# Patient Record
Sex: MALE | Race: WHITE | NOT HISPANIC OR LATINO | URBAN - METROPOLITAN AREA
[De-identification: names, ages, dates, MRNs, and addresses within clinical notes are randomized per-mention and may not be internally consistent; named-entity substitution may affect disease eponyms.]

---

## 2024-01-03 ENCOUNTER — EMERGENCY (EMERGENCY)
Facility: HOSPITAL | Age: 64
LOS: 1 days | Discharge: HOME | End: 2024-01-03
Attending: EMERGENCY MEDICINE | Admitting: EMERGENCY MEDICINE
Payer: COMMERCIAL

## 2024-01-03 VITALS
OXYGEN SATURATION: 96 % | HEART RATE: 62 BPM | RESPIRATION RATE: 18 BRPM | DIASTOLIC BLOOD PRESSURE: 82 MMHG | TEMPERATURE: 98 F | SYSTOLIC BLOOD PRESSURE: 131 MMHG

## 2024-01-03 VITALS
HEART RATE: 93 BPM | TEMPERATURE: 98 F | DIASTOLIC BLOOD PRESSURE: 93 MMHG | OXYGEN SATURATION: 96 % | RESPIRATION RATE: 16 BRPM | WEIGHT: 177.69 LBS | SYSTOLIC BLOOD PRESSURE: 167 MMHG | HEIGHT: 69 IN

## 2024-01-03 LAB
ANION GAP SERPL CALC-SCNC: 14 MMOL/L — SIGNIFICANT CHANGE UP (ref 5–17)
ANION GAP SERPL CALC-SCNC: 14 MMOL/L — SIGNIFICANT CHANGE UP (ref 5–17)
APTT BLD: 27.6 SEC — SIGNIFICANT CHANGE UP (ref 24.5–35.6)
APTT BLD: 27.6 SEC — SIGNIFICANT CHANGE UP (ref 24.5–35.6)
BASOPHILS # BLD AUTO: 0.06 K/UL — SIGNIFICANT CHANGE UP (ref 0–0.2)
BASOPHILS # BLD AUTO: 0.06 K/UL — SIGNIFICANT CHANGE UP (ref 0–0.2)
BASOPHILS NFR BLD AUTO: 1.1 % — SIGNIFICANT CHANGE UP (ref 0–2)
BASOPHILS NFR BLD AUTO: 1.1 % — SIGNIFICANT CHANGE UP (ref 0–2)
BUN SERPL-MCNC: 21 MG/DL — SIGNIFICANT CHANGE UP (ref 7–23)
BUN SERPL-MCNC: 21 MG/DL — SIGNIFICANT CHANGE UP (ref 7–23)
CALCIUM SERPL-MCNC: 10.1 MG/DL — SIGNIFICANT CHANGE UP (ref 8.4–10.5)
CALCIUM SERPL-MCNC: 10.1 MG/DL — SIGNIFICANT CHANGE UP (ref 8.4–10.5)
CHLORIDE SERPL-SCNC: 99 MMOL/L — SIGNIFICANT CHANGE UP (ref 96–108)
CHLORIDE SERPL-SCNC: 99 MMOL/L — SIGNIFICANT CHANGE UP (ref 96–108)
CO2 SERPL-SCNC: 21 MMOL/L — LOW (ref 22–31)
CO2 SERPL-SCNC: 21 MMOL/L — LOW (ref 22–31)
CREAT SERPL-MCNC: 1.23 MG/DL — SIGNIFICANT CHANGE UP (ref 0.5–1.3)
CREAT SERPL-MCNC: 1.23 MG/DL — SIGNIFICANT CHANGE UP (ref 0.5–1.3)
EGFR: 66 ML/MIN/1.73M2 — SIGNIFICANT CHANGE UP
EGFR: 66 ML/MIN/1.73M2 — SIGNIFICANT CHANGE UP
EOSINOPHIL # BLD AUTO: 0.06 K/UL — SIGNIFICANT CHANGE UP (ref 0–0.5)
EOSINOPHIL # BLD AUTO: 0.06 K/UL — SIGNIFICANT CHANGE UP (ref 0–0.5)
EOSINOPHIL NFR BLD AUTO: 1.1 % — SIGNIFICANT CHANGE UP (ref 0–6)
EOSINOPHIL NFR BLD AUTO: 1.1 % — SIGNIFICANT CHANGE UP (ref 0–6)
GLUCOSE SERPL-MCNC: 108 MG/DL — HIGH (ref 70–99)
GLUCOSE SERPL-MCNC: 108 MG/DL — HIGH (ref 70–99)
HCT VFR BLD CALC: 45.1 % — SIGNIFICANT CHANGE UP (ref 39–50)
HCT VFR BLD CALC: 45.1 % — SIGNIFICANT CHANGE UP (ref 39–50)
HGB BLD-MCNC: 15.8 G/DL — SIGNIFICANT CHANGE UP (ref 13–17)
HGB BLD-MCNC: 15.8 G/DL — SIGNIFICANT CHANGE UP (ref 13–17)
IMM GRANULOCYTES NFR BLD AUTO: 0.5 % — SIGNIFICANT CHANGE UP (ref 0–0.9)
IMM GRANULOCYTES NFR BLD AUTO: 0.5 % — SIGNIFICANT CHANGE UP (ref 0–0.9)
INR BLD: 1 — SIGNIFICANT CHANGE UP (ref 0.85–1.18)
INR BLD: 1 — SIGNIFICANT CHANGE UP (ref 0.85–1.18)
LYMPHOCYTES # BLD AUTO: 1.36 K/UL — SIGNIFICANT CHANGE UP (ref 1–3.3)
LYMPHOCYTES # BLD AUTO: 1.36 K/UL — SIGNIFICANT CHANGE UP (ref 1–3.3)
LYMPHOCYTES # BLD AUTO: 24.1 % — SIGNIFICANT CHANGE UP (ref 13–44)
LYMPHOCYTES # BLD AUTO: 24.1 % — SIGNIFICANT CHANGE UP (ref 13–44)
MCHC RBC-ENTMCNC: 34.3 PG — HIGH (ref 27–34)
MCHC RBC-ENTMCNC: 34.3 PG — HIGH (ref 27–34)
MCHC RBC-ENTMCNC: 35 GM/DL — SIGNIFICANT CHANGE UP (ref 32–36)
MCHC RBC-ENTMCNC: 35 GM/DL — SIGNIFICANT CHANGE UP (ref 32–36)
MCV RBC AUTO: 97.8 FL — SIGNIFICANT CHANGE UP (ref 80–100)
MCV RBC AUTO: 97.8 FL — SIGNIFICANT CHANGE UP (ref 80–100)
MONOCYTES # BLD AUTO: 0.7 K/UL — SIGNIFICANT CHANGE UP (ref 0–0.9)
MONOCYTES # BLD AUTO: 0.7 K/UL — SIGNIFICANT CHANGE UP (ref 0–0.9)
MONOCYTES NFR BLD AUTO: 12.4 % — SIGNIFICANT CHANGE UP (ref 2–14)
MONOCYTES NFR BLD AUTO: 12.4 % — SIGNIFICANT CHANGE UP (ref 2–14)
NEUTROPHILS # BLD AUTO: 3.43 K/UL — SIGNIFICANT CHANGE UP (ref 1.8–7.4)
NEUTROPHILS # BLD AUTO: 3.43 K/UL — SIGNIFICANT CHANGE UP (ref 1.8–7.4)
NEUTROPHILS NFR BLD AUTO: 60.8 % — SIGNIFICANT CHANGE UP (ref 43–77)
NEUTROPHILS NFR BLD AUTO: 60.8 % — SIGNIFICANT CHANGE UP (ref 43–77)
NRBC # BLD: 0 /100 WBCS — SIGNIFICANT CHANGE UP (ref 0–0)
NRBC # BLD: 0 /100 WBCS — SIGNIFICANT CHANGE UP (ref 0–0)
PLATELET # BLD AUTO: 214 K/UL — SIGNIFICANT CHANGE UP (ref 150–400)
PLATELET # BLD AUTO: 214 K/UL — SIGNIFICANT CHANGE UP (ref 150–400)
POTASSIUM SERPL-MCNC: 3.9 MMOL/L — SIGNIFICANT CHANGE UP (ref 3.5–5.3)
POTASSIUM SERPL-MCNC: 3.9 MMOL/L — SIGNIFICANT CHANGE UP (ref 3.5–5.3)
POTASSIUM SERPL-SCNC: 3.9 MMOL/L — SIGNIFICANT CHANGE UP (ref 3.5–5.3)
POTASSIUM SERPL-SCNC: 3.9 MMOL/L — SIGNIFICANT CHANGE UP (ref 3.5–5.3)
PROTHROM AB SERPL-ACNC: 11.4 SEC — SIGNIFICANT CHANGE UP (ref 9.5–13)
PROTHROM AB SERPL-ACNC: 11.4 SEC — SIGNIFICANT CHANGE UP (ref 9.5–13)
RBC # BLD: 4.61 M/UL — SIGNIFICANT CHANGE UP (ref 4.2–5.8)
RBC # BLD: 4.61 M/UL — SIGNIFICANT CHANGE UP (ref 4.2–5.8)
RBC # FLD: 12.4 % — SIGNIFICANT CHANGE UP (ref 10.3–14.5)
RBC # FLD: 12.4 % — SIGNIFICANT CHANGE UP (ref 10.3–14.5)
SODIUM SERPL-SCNC: 134 MMOL/L — LOW (ref 135–145)
SODIUM SERPL-SCNC: 134 MMOL/L — LOW (ref 135–145)
WBC # BLD: 5.64 K/UL — SIGNIFICANT CHANGE UP (ref 3.8–10.5)
WBC # BLD: 5.64 K/UL — SIGNIFICANT CHANGE UP (ref 3.8–10.5)
WBC # FLD AUTO: 5.64 K/UL — SIGNIFICANT CHANGE UP (ref 3.8–10.5)
WBC # FLD AUTO: 5.64 K/UL — SIGNIFICANT CHANGE UP (ref 3.8–10.5)

## 2024-01-03 PROCEDURE — 80048 BASIC METABOLIC PNL TOTAL CA: CPT

## 2024-01-03 PROCEDURE — 93005 ELECTROCARDIOGRAM TRACING: CPT

## 2024-01-03 PROCEDURE — 99283 EMERGENCY DEPT VISIT LOW MDM: CPT | Mod: 25

## 2024-01-03 PROCEDURE — 36415 COLL VENOUS BLD VENIPUNCTURE: CPT

## 2024-01-03 PROCEDURE — 85610 PROTHROMBIN TIME: CPT

## 2024-01-03 PROCEDURE — 85025 COMPLETE CBC W/AUTO DIFF WBC: CPT

## 2024-01-03 PROCEDURE — 85730 THROMBOPLASTIN TIME PARTIAL: CPT

## 2024-01-03 PROCEDURE — 99284 EMERGENCY DEPT VISIT MOD MDM: CPT

## 2024-01-03 RX ORDER — RIVAROXABAN 15 MG-20MG
1 KIT ORAL
Qty: 30 | Refills: 2
Start: 2024-01-03

## 2024-01-03 RX ORDER — RIVAROXABAN 15 MG-20MG
1 KIT ORAL
Qty: 42 | Refills: 0
Start: 2024-01-03 | End: 2024-01-23

## 2024-01-03 NOTE — ED ADULT NURSE NOTE - NSFALLUNIVINTERV_ED_ALL_ED
Bed/Stretcher in lowest position, wheels locked, appropriate side rails in place/Call bell, personal items and telephone in reach/Instruct patient to call for assistance before getting out of bed/chair/stretcher/Non-slip footwear applied when patient is off stretcher/[place] to call system/Physically safe environment - no spills, clutter or unnecessary equipment/Purposeful proactive rounding/Room/bathroom lighting operational, light cord in reach Bed/Stretcher in lowest position, wheels locked, appropriate side rails in place/Call bell, personal items and telephone in reach/Instruct patient to call for assistance before getting out of bed/chair/stretcher/Non-slip footwear applied when patient is off stretcher/[place] to call system/Physically safe environment - no spills, clutter or unnecessary equipment/Purposeful proactive rounding/Room/bathroom lighting operational, light cord in reach

## 2024-01-03 NOTE — ED PROVIDER NOTE - IV ALTEPLASE EXCL REL HIDDEN
show Terbinafine Pregnancy And Lactation Text: This medication is Pregnancy Category B and is considered safe during pregnancy. It is also excreted in breast milk and breast feeding isn't recommended.

## 2024-01-03 NOTE — ED PROVIDER NOTE - INTERPRETATION
incomplete RBBB, left posterior fascicular block, anterior infarct age undetermined/normal sinus rhythm

## 2024-01-03 NOTE — ED PROVIDER NOTE - PATIENT PORTAL LINK FT
You can access the FollowMyHealth Patient Portal offered by [facility] by registering at the following website: [email or web address]. By joining [employer]’s FollowMyHealth portal, you will also be able to view your health information using other applications (apps) compatible with our system. You can access the FollowMyHealth Patient Portal offered by [facility] by registering at the following website: [email or web address]. By joining [employer]’s FollowMyHealth portal, you will also be able to view your health information using other applications (apps) compatible with our system.

## 2024-01-03 NOTE — ED ADULT NURSE NOTE - OBJECTIVE STATEMENT
63y pmhx DVT in Oct 2022 presents to ER from a radiology appt due to a DVT found in left upper leg. Pt states that he flew from [place] last Thursday, has a bit of calf pain and went to urgent care, which prompted a radiology appt for leg. Pt currently denies any pain, states left lower calf feels warmer than right. Denies SOB/CP, N/V/D, F/C. 63y pmhx DVT in Oct 2022 presents to ER from a radiology appt due to a DVT found in left upper leg. Pt states that he flew from [place] last Thursday, has a bit of calf pain and went to urgent care, which prompted a radiology appt for leg. Pt currently denies any pain, states left lower calf feels warmer than right. Denies SOB/CP, N/V/D, F/C.

## 2024-01-03 NOTE — ED PROVIDER NOTE - NSFOLLOWUPCLINICS_GEN_ALL_ED_FT
[facility]  Family Medicine  [address]  [place], [place] [address]  Phone: [phone]  Fax:   Follow Up Time: 4-6 Days     [facility]  Family Medicine  [address]  [place], [place] [address]  Phone: [phone]  Fax:   Follow Up Time: 4-6 Days

## 2024-01-03 NOTE — ED PROVIDER NOTE - OBJECTIVE STATEMENT
63-year-old male with history of DVT to left lower extremity approximately 1 year ago for which he was on rivaroxaban for 4-5 months which was subsequently discontinued by his physicians presents today with left lower extremity swelling and pain in the context of taking a flight from [place] to [place] approximately 1 week ago.  Patient noted the day after he took the flight he had left lower extremity swelling of the left calf with pain.  Went to an urgent care and was told he had cellulitis and was started on antibiotics.  Patient subsequently had a DVT study done outpatient today and was told that he had a DVT, possibly chronic and was sent to ED for further evaluation.  No chest pain, shortness of breath, fevers, headache, chills or any other complaints. 63-year-old male with history of DVT to left lower extremity approximately 1 year ago for which he was on rivaroxaban for 4-5 months which was subsequently discontinued by his physicians presents today with left lower extremity swelling and pain in the context of taking a flight from [place] to [place] approximately 1 week ago.  Patient noted the day after he took the flight he had left lower extremity swelling of the left calf with pain.  Went to an urgent care and was told he had cellulitis and was started on antibiotics.  Patient subsequently had a DVT study done outpatient today and was told that he had a DVT, possibly chronic and was sent to ED for further evaluation.  No chest pain, shortness of breath, fevers, headache, chills or any other complaints. 63-year-old male with history of DVT to left lower extremity approximately 1 year ago for which he was on rivaroxaban for 4-5 months, which was subsequently discontinued by his physicians, presents today with left lower extremity swelling and pain in the context of taking a flight from [place] to [place] approximately 1 week ago. Patient noted the day after he took the flight he had left lower extremity/ left calf pain with pain.  Went to an urgent care and was told he had cellulitis and was started on antibiotics.  Patient subsequently had an outpatient DVT study done today and was told that he had a DVT, possibly chronic, and was sent to [facility] ED for further evaluation.  No chest pain, shortness of breath, fevers, headache, chills or any other complaints. 63-year-old male with history of DVT to left lower extremity approximately 1 year ago for which he was on rivaroxaban for 4-5 months, which was subsequently discontinued by his physicians, presents today with left lower extremity swelling and pain in the context of taking a flight from [place] to [place] approximately 1 week ago. Patient noted the day after he took the flight he had left lower extremity/ left calf pain with pain.  Went to an urgent care and was told he had cellulitis and was started on antibiotics.  Patient subsequently had an outpatient DVT study done today and was told that he had a DVT, possibly chronic, and was sent to [facility] ED for further evaluation.  No chest pain, shortness of breath, fevers, headache, chills or any other complaints.

## 2024-01-03 NOTE — ED ADULT NURSE NOTE - CHIEF COMPLAINT QUOTE
Pt presents to ED C/O L leg pain with dx known blood clot. Pt has imaging at bedside. States, " They said I have a clot going all the way up my groin that has been there for some time" Hx DVT in 2022. Reports frequent flights from [place] to [place]. Pt presents to ED C/O L leg pain with dx known blood clot. Pt has imaging at bedside. States, " They said I have a clot going all the way up my groin that has been there for some time" Hx DVT in 2022. Reports frequent flights from [place] to [place].

## 2024-01-03 NOTE — ED PROVIDER NOTE - NSFOLLOWUPINSTRUCTIONS_ED_ALL_ED_FT
Size Of Lesion In Cm (Optional): 0
Body Location Override (Optional - Billing Will Still Be Based On Selected Body Map Location If Applicable): R chest
Detail Level: Simple
Body Location Override (Optional - Billing Will Still Be Based On Selected Body Map Location If Applicable): R flakita
Please follow-up with your primary care doctor in 1 week.  Return to the ER if you develop any concerning symptoms.    Deep Vein Thrombosis    A deep vein thrombosis (DVT) is a blood clot (thrombus) that usually occurs in a deep, larger vein of the lower leg or the pelvis, or in an upper extremity such as the arm. These are dangerous and can lead to serious and even life-threatening complications if the clot travels to the lungs. Symptoms include swelling of the arm or leg, warmth and redness of the arm or leg, and pain. Treatment may include taking a blood thinning medication; make sure to take anything prescribed as instructed.    SEEK IMMEDIATE MEDICAL CARE IF YOU HAVE ANY OF THE FOLLOWING SYMPTOMS: shortness of breath, chest pain, rapid or irregular heartbeat, lightheadedness/dizziness, coughing up blood, or any bleeding in your vomit, stool, or urine. These symptoms may represent a serious problem that is an emergency. Do not wait to see if the symptoms will go away. Call 911 and do not drive yourself to the hospital.    Hypertension    Hypertension, commonly called high blood pressure, is when the force of blood pumping through your arteries is too strong. Hypertension forces your heart to work harder to pump blood. Your arteries may become narrow or stiff. Having untreated or uncontrolled hypertension for a long period of time can cause heart attack, stroke, kidney disease, and other problems. If started on a medication, take exactly as prescribed by your health care professional. Maintain a healthy lifestyle and follow up with your primary care physician.    SEEK IMMEDIATE MEDICAL CARE IF YOU HAVE ANY OF THE FOLLOWING SYMPTOMS: severe headache, confusion, chest pain, abdominal pain, vomiting, or shortness of breath.

## 2024-01-03 NOTE — ED ADULT TRIAGE NOTE - CHIEF COMPLAINT QUOTE
Pt presents to ED C/O L leg pain with dx known blood clot. Pt has imaging at bedside. States, " They said I have a clot going all the way up my groin that has been there for some time" Hx DVT in 2022. Reports frequent flights from [place] to [place]. Pt presents to ED C/O L leg pain with dx known blood clot. Pt has imaging at bedside. States, " They said I have a clot going all the way up my groin that has been there for some time" Hx DVT in 2022. Reports frequent flights from [place] to [place].

## 2024-01-03 NOTE — ED PROVIDER NOTE - CLINICAL SUMMARY MEDICAL DECISION MAKING FREE TEXT BOX
63-year-old male with history of DVT to left lower extremity approximately 1 year ago for which he was on rivaroxaban for 4-5 months, which was subsequently discontinued by his physicians, presents today with left lower extremity swelling and pain in the context of taking a flight from [place] to [place] approximately 1 week ago. Patient noted the day after he took the flight he had left lower extremity/ left calf pain with pain.  Went to an urgent care and was told he had cellulitis and was started on antibiotics.  Patient subsequently had an outpatient DVT study done today and was told that he had a DVT, possibly chronic, and was sent to [facility] ED for further evaluation.  No chest pain, shortness of breath, fevers, headache, chills or any other complaints.    ED course: VS noted. Patient afebrile and hypertensive with BPs 160s/90s. Rest of vital signs are within normal limits. ECG noted and with no acute findings. DVT study from outpatient radiology uploaded to our system. Images discussed with radiology resident who states that patient has a DVT in his femoral vein, peroneal vein and popliteal veins.  However it appears to be chronic with some blood flow noted in this area. Symptoms likely secondary to acute over chronic DVT.  Labs done and noted.  Patient with normal renal function. Started on Rivaroxaban with initial 21 bid course followed by a once daily dosing. Rxs given. High BP reads discussed with patient who denies hx of HTN but notes that he is concerned about the new developments. High BP likely secondary to anxiety, but patient will f/up with PCP regarding this. Denies any chest pain or shortness of breath. Non-toxic appearing and stable for discharge. To f/up with PCP. Strict return precautions given. 63-year-old male with history of DVT to left lower extremity approximately 1 year ago for which he was on rivaroxaban for 4-5 months, which was subsequently discontinued by his physicians, presents today with left lower extremity swelling and pain in the context of taking a flight from [place] to [place] approximately 1 week ago. Patient noted the day after he took the flight he had left lower extremity/ left calf pain with pain.  Went to an urgent care and was told he had cellulitis and was started on antibiotics.  Patient subsequently had an outpatient DVT study done today and was told that he had a DVT, possibly chronic, and was sent to [facility] ED for further evaluation.  No chest pain, shortness of breath, fevers, headache, chills or any other complaints.    ED course: VS noted. Patient afebrile and hypertensive with BPs 160s/90s. Rest of vital signs are within normal limits. ECG noted and with no acute findings. DVT study from outpatient radiology uploaded to our system. Images discussed with radiology resident who states that patient has a DVT in his femoral vein, peroneal vein and popliteal veins.  However it appears to be chronic with some blood flow noted in this area. Symptoms likely secondary to acute over chronic DVT.  Labs done and noted.  Patient with normal renal function. Started on Rivaroxaban with initial 21 bid course followed by a once daily dosing. Rxs given. High BP reads discussed with patient who denies hx of HTN but notes that he is concerned about the new developments. High BP likely secondary to anxiety, but patient will f/up with PCP regarding this. Denies any chest pain or shortness of breath. Non-toxic appearing and stable for discharge. To f/up with PCP. Strict return precautions given.

## 2024-01-06 DIAGNOSIS — I82.452 ACUTE EMBOLISM AND THROMBOSIS OF LEFT PERONEAL VEIN: ICD-10-CM

## 2024-01-06 DIAGNOSIS — I45.10 UNSPECIFIED RIGHT BUNDLE-BRANCH BLOCK: ICD-10-CM

## 2024-01-06 DIAGNOSIS — M79.89 OTHER SPECIFIED SOFT TISSUE DISORDERS: ICD-10-CM

## 2024-01-06 DIAGNOSIS — I44.5 LEFT POSTERIOR FASCICULAR BLOCK: ICD-10-CM

## 2024-01-06 DIAGNOSIS — I82.432 ACUTE EMBOLISM AND THROMBOSIS OF LEFT POPLITEAL VEIN: ICD-10-CM

## 2024-01-06 DIAGNOSIS — I82.412 ACUTE EMBOLISM AND THROMBOSIS OF LEFT FEMORAL VEIN: ICD-10-CM

## 2024-01-08 ENCOUNTER — APPOINTMENT (OUTPATIENT)
Dept: INTERNAL MEDICINE | Facility: CLINIC | Age: 64
End: 2024-01-08
Payer: SELF-PAY

## 2024-01-08 VITALS
HEART RATE: 70 BPM | WEIGHT: 176.37 LBS | RESPIRATION RATE: 12 BRPM | OXYGEN SATURATION: 97 % | SYSTOLIC BLOOD PRESSURE: 149 MMHG | DIASTOLIC BLOOD PRESSURE: 98 MMHG

## 2024-01-08 DIAGNOSIS — I82.409 ACUTE EMBOLISM AND THROMBOSIS OF UNSPECIFIED DEEP VEINS OF UNSPECIFIED LOWER EXTREMITY: ICD-10-CM

## 2024-01-08 DIAGNOSIS — R03.0 ELEVATED BLOOD-PRESSURE READING, W/OUT DIAGNOSIS OF HYPERTENSION: ICD-10-CM

## 2024-01-08 DIAGNOSIS — Z86.718 PERSONAL HISTORY OF OTHER VENOUS THROMBOSIS AND EMBOLISM: ICD-10-CM

## 2024-01-08 DIAGNOSIS — Z82.49 FAMILY HISTORY OF ISCHEMIC HEART DISEASE AND OTHER DISEASES OF THE CIRCULATORY SYSTEM: ICD-10-CM

## 2024-01-08 PROBLEM — Z00.00 ENCOUNTER FOR PREVENTIVE HEALTH EXAMINATION: Status: ACTIVE | Noted: 2024-01-08

## 2024-01-08 PROCEDURE — 99204 OFFICE O/P NEW MOD 45 MIN: CPT

## 2024-01-08 RX ORDER — RIVAROXABAN 15 MG-20MG
15 & 20 KIT ORAL
Refills: 0 | Status: ACTIVE | COMMUNITY
Start: 2024-01-08

## 2024-06-15 ENCOUNTER — EMERGENCY (EMERGENCY)
Facility: HOSPITAL | Age: 64
LOS: 1 days | Discharge: HOME | End: 2024-06-15
Admitting: EMERGENCY MEDICINE
Payer: SELF-PAY

## 2024-06-15 VITALS
RESPIRATION RATE: 16 BRPM | OXYGEN SATURATION: 97 % | HEART RATE: 57 BPM | TEMPERATURE: 98 F | SYSTOLIC BLOOD PRESSURE: 143 MMHG | DIASTOLIC BLOOD PRESSURE: 75 MMHG

## 2024-06-15 VITALS
SYSTOLIC BLOOD PRESSURE: 142 MMHG | RESPIRATION RATE: 18 BRPM | TEMPERATURE: 99 F | HEART RATE: 81 BPM | WEIGHT: 179.9 LBS | DIASTOLIC BLOOD PRESSURE: 91 MMHG | OXYGEN SATURATION: 96 %
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
## 2024-06-15 DIAGNOSIS — Z79.01 LONG TERM (CURRENT) USE OF ANTICOAGULANTS: ICD-10-CM
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
## 2024-06-15 DIAGNOSIS — S80.11XA CONTUSION OF RIGHT LOWER LEG, INITIAL ENCOUNTER: ICD-10-CM

## 2024-06-15 DIAGNOSIS — Z86.718 PERSONAL HISTORY OF OTHER VENOUS THROMBOSIS AND EMBOLISM: ICD-10-CM

## 2024-06-15 PROCEDURE — 93971 EXTREMITY STUDY: CPT | Mod: 26,RT

## 2024-06-15 PROCEDURE — 99284 EMERGENCY DEPT VISIT MOD MDM: CPT

## 2024-06-15 NOTE — ED PROVIDER NOTE - PROGRESS NOTE DETAILS
US shows chronic post thrombotic changes in the deep veins of the right lower extremity and in the left common femoral vein. discussed findings with patient, continue xeralto, f/u pmd, return precautions discussed, patient agrees with plan

## 2024-06-15 NOTE — ED PROVIDER NOTE - PHYSICAL EXAMINATION
CONSTITUTIONAL: Well-appearing; well-nourished; in no apparent distress.   	HEAD: Normocephalic; atraumatic.   RLE: superficial small bruise present to calf, no swelling or calf tenderness, dpi, soft compartments. good ROM joints. ambulatory without difficulty.   	SKIN: Normal for age and race; warm; dry; good turgor; no apparent lesions or rash.   	NEURO: A & O x 3; face symmetric; grossly unremarkable.   PSYCHOLOGICAL: The patient’s mood and manner are appropriate.

## 2024-06-15 NOTE — ED PROVIDER NOTE - PATIENT PORTAL LINK FT
You can access the FollowMyHealth Patient Portal offered by [facility] by registering at the following website: [email or web address] By joining [employer]’s FollowMyHealth portal, you will also be able to view your health information using other applications (apps) compatible with our system.

## 2024-06-15 NOTE — ED PROVIDER NOTE - CLINICAL SUMMARY MEDICAL DECISION MAKING FREE TEXT BOX
Chronic post thrombotic changes in the deep veins of the right lower extremity and in the left common femoral vein. hx DVTs on xarelto, presents c/o noticing bruise to right calf after possible scratching the area, no pain or swelling to leg, no s/s PE. will obtain US r/o DVT.

## 2024-06-15 NOTE — ED PROVIDER NOTE - NSFOLLOWUPINSTRUCTIONS_ED_ALL_ED_FT
Your ultrasound of your right leg shows chronic blood clots  Please continue taking your blood thinners.   Please follow up with your doctor.  Please consider speaking to your doctor about having a hypercoagulable workup to assess for clotting disorders.     Return to the emergency room for any concerning or worsening symptoms such as shortness of breath, chest pain, dizziness, passing out, severe pain or any concerns.

## 2024-06-15 NOTE — ED ADULT NURSE NOTE - OBJECTIVE STATEMENT
pt a&ox3 c/o unexplained bruise/discoloration above R. calf since yesterday. PMH DVT x2, was on xarelto. recently arrived from [place] on Thursday. no swelling/pain noted to RLE.

## 2024-06-15 NOTE — ED ADULT TRIAGE NOTE - CHIEF COMPLAINT QUOTE
unexplained bruise to rt calf . PMH DVT sent from [facility] to r/o DVT. Pt taking xarelto 20mg daily. recently arrived from long flight on Thursday.

## 2024-06-15 NOTE — ED PROVIDER NOTE - OBJECTIVE STATEMENT
63-year-old  male with history of left lower extremity DVT on Xarelto presents complaining of a small bruise to the right calf that he noticed today.  He reports scratching his calf yesterday after what he thought was a bug bite.  He denies calf pain, calf swelling, drainage, fever, chills.  He reports frequent travel to and from [place].  Denies chest pain or shortness of breath.  Patient is concerned for DVT.

## 2024-06-15 NOTE — ED ADULT NURSE NOTE - NSFALLUNIVINTERV_ED_ALL_ED
Bed/Stretcher in lowest position, wheels locked, appropriate side rails in place/Call bell, personal items and telephone in reach/Instruct patient to call for assistance before getting out of bed/chair/stretcher/Non-slip footwear applied when patient is off stretcher/[place] to call system/Physically safe environment - no spills, clutter or unnecessary equipment/Purposeful proactive rounding/Room/bathroom lighting operational, light cord in reach
